# Patient Record
Sex: FEMALE | Race: WHITE
[De-identification: names, ages, dates, MRNs, and addresses within clinical notes are randomized per-mention and may not be internally consistent; named-entity substitution may affect disease eponyms.]

---

## 2017-05-13 ENCOUNTER — HOSPITAL ENCOUNTER (OUTPATIENT)
Dept: HOSPITAL 53 - M LAB REF | Age: 37
End: 2017-05-13
Attending: PHYSICIAN ASSISTANT
Payer: COMMERCIAL

## 2017-05-13 DIAGNOSIS — M54.5: Primary | ICD-10-CM

## 2020-02-13 ENCOUNTER — HOSPITAL ENCOUNTER (OUTPATIENT)
Dept: HOSPITAL 53 - M SFHCRHEU | Age: 40
End: 2020-02-13
Attending: INTERNAL MEDICINE
Payer: COMMERCIAL

## 2020-02-13 DIAGNOSIS — E83.110: ICD-10-CM

## 2020-02-13 DIAGNOSIS — M06.4: Primary | ICD-10-CM

## 2020-02-13 DIAGNOSIS — R76.8: ICD-10-CM

## 2020-02-13 LAB
ALBUMIN SERPL BCG-MCNC: 3.5 GM/DL (ref 3.2–5.2)
ALT SERPL W P-5'-P-CCNC: 28 U/L (ref 12–78)
BASOPHILS # BLD AUTO: 0 10^3/UL (ref 0–0.2)
BASOPHILS NFR BLD AUTO: 0.5 % (ref 0–1)
BILIRUB SERPL-MCNC: 0.5 MG/DL (ref 0.2–1)
BUN SERPL-MCNC: 11 MG/DL (ref 7–18)
CALCIUM SERPL-MCNC: 8.7 MG/DL (ref 8.5–10.1)
CHLORIDE SERPL-SCNC: 105 MEQ/L (ref 98–107)
CO2 SERPL-SCNC: 29 MEQ/L (ref 21–32)
CREAT SERPL-MCNC: 0.73 MG/DL (ref 0.55–1.3)
CRP SERPL-MCNC: 0.64 MG/DL (ref 0–0.3)
EOSINOPHIL # BLD AUTO: 0 10^3/UL (ref 0–0.5)
EOSINOPHIL NFR BLD AUTO: 0.7 % (ref 0–3)
ERYTHROCYTE [SEDIMENTATION RATE] IN BLOOD BY WESTERGREN METHOD: 7 MM/HR (ref 0–20)
FERRITIN SERPL-MCNC: 20 NG/ML (ref 8–252)
GFR SERPL CREATININE-BSD FRML MDRD: > 60 ML/MIN/{1.73_M2} (ref 58–?)
GLUCOSE SERPL-MCNC: 73 MG/DL (ref 70–100)
HBV SURFACE AB SER-ACNC: NEGATIVE M[IU]/ML
HCT VFR BLD AUTO: 42.5 % (ref 36–47)
HGB BLD-MCNC: 13.5 G/DL (ref 12–15.5)
LYMPHOCYTES # BLD AUTO: 1.9 10^3/UL (ref 1.5–5)
LYMPHOCYTES NFR BLD AUTO: 34.1 % (ref 24–44)
MCH RBC QN AUTO: 30.5 PG (ref 27–33)
MCHC RBC AUTO-ENTMCNC: 31.8 G/DL (ref 32–36.5)
MCV RBC AUTO: 96.2 FL (ref 80–96)
MONOCYTES # BLD AUTO: 0.4 10^3/UL (ref 0–0.8)
MONOCYTES NFR BLD AUTO: 6.4 % (ref 0–5)
NEUTROPHILS # BLD AUTO: 3.2 10^3/UL (ref 1.5–8.5)
NEUTROPHILS NFR BLD AUTO: 58.1 % (ref 36–66)
PLATELET # BLD AUTO: 336 10^3/UL (ref 150–450)
POTASSIUM SERPL-SCNC: 4.4 MEQ/L (ref 3.5–5.1)
PROT SERPL-MCNC: 7.3 GM/DL (ref 6.4–8.2)
RBC # BLD AUTO: 4.42 10^6/UL (ref 4–5.4)
RHEUMATOID FACT SERPL-ACNC: < 10 IU/ML (ref ?–15)
SODIUM SERPL-SCNC: 140 MEQ/L (ref 136–145)
WBC # BLD AUTO: 5.5 10^3/UL (ref 4–10)

## 2020-02-14 LAB — HCV AB SER QL: < 0 INDEX (ref ?–0.8)

## 2020-02-18 LAB
CCP IGG SERPL-ACNC: 8 UNITS (ref 0–19)
CENTROMERE B AB SER-ACNC: 0.5 AI (ref 0–0.9)

## 2020-03-05 ENCOUNTER — HOSPITAL ENCOUNTER (OUTPATIENT)
Dept: HOSPITAL 53 - M RAD | Age: 40
End: 2020-03-05
Attending: INTERNAL MEDICINE
Payer: COMMERCIAL

## 2020-03-05 DIAGNOSIS — M54.5: Primary | ICD-10-CM

## 2020-03-05 NOTE — REP
MRI pelvis without contrast:

 

History:  Several years of pelvic pain.  No comparison imaging.  Sacroiliac joint

pain.  Question SI joint inflammation.  HLA-B 27 positive.  X-rays negative by

history.

 

TECHNIQUE:  Axial, oblique coronal, and sagittal images are acquired.  T1 and

T2-weighted scans were included with and without fat saturation.

 

MRI findings:  No uterine or ovarian abnormality is observed.  Urinary bladder

walls are smooth.  No pelvic mass or adenopathy is seen.  The lumbosacral thecal

sac appears intact.  There is disc bulging at L5-S1 and some degenerative disc

narrowing.  No sacral or coccygeal lesion is seen.  T2-weighted scans with fat

saturation through the SI joints show no evidence of erosive change, adjacent

inflammation, fluid, or ankylosis.  The presacral and retro sacral soft tissues

are unremarkable.

 

Incidental note is made of a small intramuscular lipoma in the right gluteus

medius muscle adjacent to the iliac crest on the right.  This measures 1.6 x  3.2

x 1.4 cm.  Cortical and medullary bone signal intensity are normal throughout the

visualized bony pelvis and proximal femurs.  No evidence of hip joint effusion.

 

Impression:

 

Negative MRI study of the sacrum, SI joints and pelvis

 

 

Electronically Signed by

Aaron Sylvester MD 03/05/2020 08:04 P